# Patient Record
Sex: FEMALE | Race: WHITE | URBAN - METROPOLITAN AREA
[De-identification: names, ages, dates, MRNs, and addresses within clinical notes are randomized per-mention and may not be internally consistent; named-entity substitution may affect disease eponyms.]

---

## 2017-01-12 ENCOUNTER — APPOINTMENT (RX ONLY)
Dept: URBAN - METROPOLITAN AREA CLINIC 130 | Facility: CLINIC | Age: 42
Setting detail: DERMATOLOGY
End: 2017-01-12

## 2017-01-12 DIAGNOSIS — L20.89 OTHER ATOPIC DERMATITIS: ICD-10-CM

## 2017-01-12 PROBLEM — M12.9 ARTHROPATHY, UNSPECIFIED: Status: ACTIVE | Noted: 2017-01-12

## 2017-01-12 PROBLEM — L30.9 DERMATITIS, UNSPECIFIED: Status: ACTIVE | Noted: 2017-01-12

## 2017-01-12 PROCEDURE — ? TREATMENT REGIMEN

## 2017-01-12 PROCEDURE — 99203 OFFICE O/P NEW LOW 30 MIN: CPT

## 2017-01-12 PROCEDURE — ? COUNSELING

## 2017-01-12 PROCEDURE — ? PRESCRIPTION

## 2017-01-12 RX ORDER — HYDROQUINONE 40 MG/G
CREAM TOPICAL
Qty: 1 | Refills: 2 | Status: ERX | COMMUNITY
Start: 2017-01-12

## 2017-01-12 RX ADMIN — HYDROQUINONE: 40 CREAM TOPICAL at 18:01

## 2017-01-12 ASSESSMENT — LOCATION ZONE DERM: LOCATION ZONE: LEG

## 2017-01-12 ASSESSMENT — LOCATION SIMPLE DESCRIPTION DERM: LOCATION SIMPLE: LEFT PRETIBIAL REGION

## 2017-01-12 ASSESSMENT — LOCATION DETAILED DESCRIPTION DERM: LOCATION DETAILED: LEFT DISTAL PRETIBIAL REGION

## 2017-01-12 NOTE — PROCEDURE: TREATMENT REGIMEN
Initiate Treatment: Pt requests a Rx for skin lightening cream as its difficult for pt to take time off from work, HQ 4%, apply to affected area qhs 2 months at a time only if steroid lotions do not help improve the lesion
Samples Given: Ultravate lotion x1, Halog cream x 2, apply cream bid until all three tubes are completed, if pt notes no improvement pt can begin to apply skin lightening cream\\nIf pt notes improvement with the steroid cream, pt can call and request additional Rx for TMC .025% cream
Detail Level: Zone